# Patient Record
Sex: MALE | Race: WHITE
[De-identification: names, ages, dates, MRNs, and addresses within clinical notes are randomized per-mention and may not be internally consistent; named-entity substitution may affect disease eponyms.]

---

## 2019-01-16 ENCOUNTER — HOSPITAL ENCOUNTER (EMERGENCY)
Dept: HOSPITAL 56 - MW.ED | Age: 59
Discharge: HOME | End: 2019-01-16
Payer: COMMERCIAL

## 2019-01-16 DIAGNOSIS — S16.1XXA: Primary | ICD-10-CM
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
## 2019-01-16 PROCEDURE — 96372 THER/PROPH/DIAG INJ SC/IM: CPT

## 2019-01-16 PROCEDURE — 72125 CT NECK SPINE W/O DYE: CPT

## 2019-01-16 PROCEDURE — 99284 EMERGENCY DEPT VISIT MOD MDM: CPT

## 2019-01-16 NOTE — EDM.PDOC
ED HPI GENERAL MEDICAL PROBLEM





- General


Chief Complaint: Neck Problem


Stated Complaint: PAIN IN NECK DOWN BACK


Time Seen by Provider: 01/16/19 12:16


Source of Information: Reports: Patient


History Limitations: Reports: No Limitations





- History of Present Illness


INITIAL COMMENTS - FREE TEXT/NARRATIVE: 


HISTORY AND PHYSICAL:





History of present illness:


Patient is a 58-year-old male who presents to the emergency room with 

complaints of low neck pain that radiates into his right shoulder and down the 

arm. He states he has noticed this discomfort for approximately 6 months that 

has progressively become more frequent and more bothersome. Over the past 

several days he states that this is extremely uncomfortable and decided to get 

evaluated. He denies any injury, trauma or recent falls.





Review of systems: 


As per history of present illness and below otherwise all systems reviewed and 

negative.





Past medical history: 


As per history of present illness and as reviewed below otherwise 

noncontributory.





Surgical history: 


As per history of present illness and as reviewed below otherwise 

noncontributory.





Social history: 


See social history for further information





Family history: 


As per history of present illness and as reviewed below otherwise 

noncontributory.





Physical exam:


General:


HEENT: Atraumatic, normocephalic, pupils equal and reactive bilaterally, 

negative for conjunctival pallor or scleral icterus, mucous membranes moist, 

throat clear, neck supple, nontender, trachea midline. No drooling or trismus 

noted. No meningeal signs


Lungs: Clear to auscultation, breath sounds equal bilaterally, chest nontender.


Heart: S1S2, regular rate and rhythm without overt murmur


Abdomen: Soft, nondistended, nontender. Negative for masses or 

hepatosplenomegaly. Negative for costovertebral tenderness.


Pelvis: Stable nontender.


Genitourinary: Deferred.


Rectal: Deferred.


Skin: Intact, warm, dry. No lesions or rashes noted.


Extremities: Atraumatic, moves all extremities without difficulty or deficits, 

full range of motion, negative for cords or calf pain. Neurovascular 

unremarkable.


C-spine/Back: No pinpoint vertebral tenderness upon palpation. No crepitus, step

-offs or obvious deformities. Patient is ambulatory without any difficulty or 

deficits. He denies any urinary or fecal incontinence. Denies any numbness or 

tingling to his distal extremities. He has full strength and range of motion of 

all extremities. Does have some muscular tenderness to the right trapezius 

muscle into the shoulder/scapular area.


Neuro: Awake, alert, oriented. Cranial nerves II through XII unremarkable. 

Cerebellum unremarkable. Motor and sensory unremarkable throughout. Exam 

nonfocal.





Notes:


Patient did get some relief with the IM injections. CT shows mild degenerative 

changes without any acute findings. I did discuss the limitations of 

diagnostics through the emergency room and encouraged him to follow-up with his 

primary care provider. He states he does go home to [place] the following week 

and will follow up with his primary care provider for further evaluation and 

management. Concerned he may have possible nerve impingement or muscular 

strain. Supportive care measures were reviewed and discussed. Signs and 

symptoms that would prompt him to return to the emergency room were reviewed 

and discussed.





Diagnostics:


C-spine CT





Therapeutics:


Solu-Medrol IM, Toradol.





Prescription:


Medrol Dosepak


Flexeril (#21)





Impression: 


Neck Pain


Muscular Strain, neck





Plan:


1. Gentle heat and stretching to the area. 


2. Take your medications as prescribed. Flexeril may cause drowsiness a do not 

take it'll driving her needing to be functioning outside of the house.


3. Please follow up with your primary care provider in the next 1-2 days. 

Return to the ED as needed and as discussed.





Definitive disposition and diagnosis as appropriate pending reevaluation and 

review of above.





Duration: Chronic


Location: Reports: Neck


  ** RIght shoulder


Pain Score (Numeric/FACES): 6





- Related Data


 Allergies











Allergy/AdvReac Type Severity Reaction Status Date / Time


 


No Known Allergies Allergy   Verified 01/16/19 12:32











Home Meds: 


 Home Meds





Gabapentin [Neurontin] 600 mg PO TID 01/16/19 [History]











ED ROS GENERAL





- Review of Systems


Review Of Systems: ROS reveals no pertinent complaints other than HPI.





ED EXAM, UPPER BACK/NECK PAIN





- Physical Exam


Exam: See Below (See dictation)





Course





- Vital Signs


Last Recorded V/S: 


 Last Vital Signs











Temp  97.8 F   01/16/19 12:30


 


Pulse  75   01/16/19 12:30


 


Resp  18   01/16/19 12:30


 


BP  181/104 H  01/16/19 12:30


 


Pulse Ox  94 L  01/16/19 12:30














- Orders/Labs/Meds


Meds: 


Medications














Discontinued Medications














Generic Name Dose Route Start Last Admin





  Trade Name Freq  PRN Reason Stop Dose Admin


 


Ketorolac Tromethamine  60 mg  01/16/19 12:42  01/16/19 12:50





  Toradol  IM  01/16/19 12:43  60 mg





  ONETIME ONE   Administration





     





     





     





     


 


Methylprednisolone Sodium Succinate  125 mg  01/16/19 12:42  01/16/19 12:50





  Solu-Medrol  IM  01/16/19 12:43  125 mg





  ONETIME ONE   Administration





     





     





     





     














Departure





- Departure


Time of Disposition: 13:42


Disposition: Home, Self-Care 01


Clinical Impression: 


 Neck pain





Neck muscle strain


Qualifiers:


 Encounter type: initial encounter Qualified Code(s): S16.1XXA - Strain of 

muscle, fascia and tendon at neck level, initial encounter








- Discharge Information


Instructions:  Muscle Strain, Easy-to-Read


Referrals: 


PCP,None [Primary Care Provider] - 


Forms:  ED Department Discharge


Additional Instructions: 


The following information is given to patients seen in the emergency department 

who are being discharged to home. This information is to outline your options 

for follow-up care. We provide all patients seen in our emergency department 

with a follow-up referral.





The need for follow-up, as well as the timing and circumstances, are variable 

depending upon the specifics of your emergency department visit.





If you don't have a primary care physician on staff, we will provide you with a 

referral. We always advise you to contact your personal physician following an 

emergency department visit to inform them of the circumstance of the visit and 

for follow-up with them and/or the need for any referrals to a consulting 

specialist.





The emergency department will also refer you to a specialist when appropriate. 

This referral assures that you have the opportunity for follow-up care with a 

specialist. All of these measure are taken in an effort to provide you with 

optimal care, which includes your follow-up.





Under all circumstances we always encourage you to contact your private 

physician who remains a resource for coordinating your care. When calling for 

follow-up care, please make the office aware that this follow-up is from your 

recent emergency room visit. If for any reason you are refused follow-up, 

please contact the [facility] Emergency 

Department at [phone] and asked to speak to the emergency department 

charge nurse.





[facility]


Primary Care


[address] [place] [id number]


Phone: [phone]


Fax: [phone]





[facility]


[address] [place] [address]


Phone: [phone]


Fax: [phone]





1. Gentle heat and stretching to the area. 


2. Take your medications as prescribed. Flexeril may cause drowsiness a do not 

take it'll driving her needing to be functioning outside of the house.


3. Please follow up with your primary care provider in the next 1-2 days. 

Return to the ED as needed and as discussed.

## 2019-01-16 NOTE — CT
EXAMINATION: CT cervical spine

 

HISTORY: Pain

 

COMPARISON: None

 

TECHNIQUE: Axial CT imaging obtained through the cervical spine without

contrast. Coronal and sagittal reconstructions obtained.

 

FINDINGS: There is straightening of the normal cervical lordosis. Vertebral body

heights appear maintained. Bone mineralization and normal. There are likely

small osteophyte disc complexes noted at C4-C5 and C5-C6 with mild right

uncovertebral hypertrophy at these levels. Temporomandibular joints are

symmetric. Lung apices are clear. Mild atheromatous calcifications noted at the

right bulb. Lung apices are clear.

 

IMPRESSION: Mild degenerative changes without acute findings.